# Patient Record
Sex: FEMALE | Race: AMERICAN INDIAN OR ALASKA NATIVE | URBAN - METROPOLITAN AREA
[De-identification: names, ages, dates, MRNs, and addresses within clinical notes are randomized per-mention and may not be internally consistent; named-entity substitution may affect disease eponyms.]

---

## 2023-02-20 ENCOUNTER — LAB OUTSIDE AN ENCOUNTER (OUTPATIENT)
Dept: URBAN - METROPOLITAN AREA CLINIC 115 | Facility: CLINIC | Age: 38
End: 2023-02-20

## 2023-02-20 ENCOUNTER — WEB ENCOUNTER (OUTPATIENT)
Dept: URBAN - METROPOLITAN AREA CLINIC 115 | Facility: CLINIC | Age: 38
End: 2023-02-20

## 2023-02-20 ENCOUNTER — OFFICE VISIT (OUTPATIENT)
Dept: URBAN - METROPOLITAN AREA CLINIC 115 | Facility: CLINIC | Age: 38
End: 2023-02-20
Payer: COMMERCIAL

## 2023-02-20 VITALS
TEMPERATURE: 98 F | HEIGHT: 62 IN | WEIGHT: 152.4 LBS | BODY MASS INDEX: 28.05 KG/M2 | DIASTOLIC BLOOD PRESSURE: 60 MMHG | HEART RATE: 67 BPM | SYSTOLIC BLOOD PRESSURE: 94 MMHG

## 2023-02-20 DIAGNOSIS — R12 HEARTBURN: ICD-10-CM

## 2023-02-20 DIAGNOSIS — R10.13 EPIGASTRIC PAIN: ICD-10-CM

## 2023-02-20 PROCEDURE — 99204 OFFICE O/P NEW MOD 45 MIN: CPT | Performed by: INTERNAL MEDICINE

## 2023-02-20 RX ORDER — OMEPRAZOLE 40 MG/1
1 CAPSULE 30 MINUTES BEFORE MORNING MEAL CAPSULE, DELAYED RELEASE ORAL ONCE A DAY
Status: ACTIVE | COMMUNITY

## 2023-02-20 NOTE — HPI-TODAY'S VISIT:
38 y/o female presents for epigastric pain that sometimes radiates throughout abdomen. Present for 6 months. If doesn't take omeprazole will have vomiting. Pain is dull pain with intermittent heartburn.  Had EGD at [facility], daughter states insurance would not pay for it so they will not give her the results.  No changes in bowel habits.   Never had colonoscopy.

## 2023-03-09 ENCOUNTER — OFFICE VISIT (OUTPATIENT)
Dept: URBAN - METROPOLITAN AREA CLINIC 114 | Facility: CLINIC | Age: 38
End: 2023-03-09

## 2023-03-13 ENCOUNTER — OFFICE VISIT (OUTPATIENT)
Dept: URBAN - METROPOLITAN AREA CLINIC 114 | Facility: CLINIC | Age: 38
End: 2023-03-13
Payer: COMMERCIAL

## 2023-03-13 DIAGNOSIS — N28.1 RENAL CYST: ICD-10-CM

## 2023-03-13 PROCEDURE — 76705 ECHO EXAM OF ABDOMEN: CPT | Performed by: INTERNAL MEDICINE

## 2023-03-13 RX ORDER — OMEPRAZOLE 40 MG/1
1 CAPSULE 30 MINUTES BEFORE MORNING MEAL CAPSULE, DELAYED RELEASE ORAL ONCE A DAY
Status: ACTIVE | COMMUNITY

## 2023-03-17 ENCOUNTER — TELEPHONE ENCOUNTER (OUTPATIENT)
Dept: URBAN - METROPOLITAN AREA CLINIC 115 | Facility: CLINIC | Age: 38
End: 2023-03-17

## 2023-03-21 ENCOUNTER — LAB OUTSIDE AN ENCOUNTER (OUTPATIENT)
Dept: URBAN - METROPOLITAN AREA CLINIC 115 | Facility: CLINIC | Age: 38
End: 2023-03-21

## 2023-03-22 ENCOUNTER — TELEPHONE ENCOUNTER (OUTPATIENT)
Dept: URBAN - METROPOLITAN AREA CLINIC 115 | Facility: CLINIC | Age: 38
End: 2023-03-22

## 2023-04-03 ENCOUNTER — TELEPHONE ENCOUNTER (OUTPATIENT)
Dept: URBAN - METROPOLITAN AREA CLINIC 115 | Facility: CLINIC | Age: 38
End: 2023-04-03

## 2023-04-04 ENCOUNTER — TELEPHONE ENCOUNTER (OUTPATIENT)
Dept: URBAN - METROPOLITAN AREA CLINIC 115 | Facility: CLINIC | Age: 38
End: 2023-04-04

## 2023-04-12 ENCOUNTER — TELEPHONE ENCOUNTER (OUTPATIENT)
Dept: URBAN - METROPOLITAN AREA CLINIC 115 | Facility: CLINIC | Age: 38
End: 2023-04-12

## 2023-05-01 ENCOUNTER — OFFICE VISIT (OUTPATIENT)
Dept: URBAN - METROPOLITAN AREA CLINIC 115 | Facility: CLINIC | Age: 38
End: 2023-05-01

## 2023-05-01 RX ORDER — OMEPRAZOLE 40 MG/1
1 CAPSULE 30 MINUTES BEFORE MORNING MEAL CAPSULE, DELAYED RELEASE ORAL ONCE A DAY
Status: ACTIVE | COMMUNITY

## 2023-05-31 ENCOUNTER — TELEPHONE ENCOUNTER (OUTPATIENT)
Dept: URBAN - METROPOLITAN AREA CLINIC 115 | Facility: CLINIC | Age: 38
End: 2023-05-31

## 2023-08-15 ENCOUNTER — TELEPHONE ENCOUNTER (OUTPATIENT)
Dept: URBAN - METROPOLITAN AREA CLINIC 82 | Facility: CLINIC | Age: 38
End: 2023-08-15

## 2023-08-16 ENCOUNTER — OFFICE VISIT (OUTPATIENT)
Dept: URBAN - METROPOLITAN AREA CLINIC 115 | Facility: CLINIC | Age: 38
End: 2023-08-16
Payer: COMMERCIAL

## 2023-08-16 VITALS
HEIGHT: 62 IN | TEMPERATURE: 97.5 F | SYSTOLIC BLOOD PRESSURE: 104 MMHG | WEIGHT: 148.4 LBS | BODY MASS INDEX: 27.31 KG/M2 | DIASTOLIC BLOOD PRESSURE: 67 MMHG | HEART RATE: 59 BPM

## 2023-08-16 DIAGNOSIS — N28.9 KIDNEY LESION: ICD-10-CM

## 2023-08-16 DIAGNOSIS — R10.13 EPIGASTRIC PAIN: ICD-10-CM

## 2023-08-16 PROBLEM — 79131000119100: Status: ACTIVE | Noted: 2023-08-16

## 2023-08-16 PROCEDURE — 91065 BREATH HYDROGEN/METHANE TEST: CPT | Performed by: STUDENT IN AN ORGANIZED HEALTH CARE EDUCATION/TRAINING PROGRAM

## 2023-08-16 PROCEDURE — 99214 OFFICE O/P EST MOD 30 MIN: CPT | Performed by: STUDENT IN AN ORGANIZED HEALTH CARE EDUCATION/TRAINING PROGRAM

## 2023-08-16 RX ORDER — OMEPRAZOLE 40 MG/1
1 CAPSULE 30 MINUTES BEFORE MORNING MEAL CAPSULE, DELAYED RELEASE ORAL ONCE A DAY
Status: DISCONTINUED | COMMUNITY

## 2023-08-16 RX ORDER — PANTOPRAZOLE SODIUM 40 MG/1
1 TABLET TABLET, DELAYED RELEASE ORAL
Qty: 30 | Refills: 1 | OUTPATIENT
Start: 2023-08-16

## 2023-08-16 NOTE — HPI-TODAY'S VISIT:
38 y.o female presents today for concerns of abd pain x years. Saw Dr. Stubbs earlier this year inregards to this concerns, US on 03/2023 notable for right kidney cyst 5.2, was informed to see , saw them, was informed that it was normal, no CT A/P completed.   At this visit, patient reports pain in periumbilical region, worse w spicy food or patient if patient is hungry. No nocturnal sx or unintentional wt loss. No N/V. Doesnt take anything for this. Stop omeprazole 40mg 4 months ago, no changes noted. Was taking for 1.5 years, no relief noted back then. Moderate relief w/ vidal-x, otc med for acid reflx. Denies any NSAIDs use. No smoking or ETOH. No fhx of gastric cancer or CRC.

## 2023-08-16 NOTE — PHYSICAL EXAM GASTROINTESTINAL
Abdomen soft, TTP in epigastic region, nondistended , no guarding or rigidity , no masses palpable , unremarkable bowel sounds , no hepatosplenomegaly

## 2023-08-16 NOTE — PHYSICAL EXAM CONSTITUTIONAL:
NAD, alert and oriented, well developed, well nourished, ambulating without difficulty, sitting comfortably in the chair. Nephew by side, translating for patient

## 2023-08-23 ENCOUNTER — TELEPHONE ENCOUNTER (OUTPATIENT)
Dept: URBAN - METROPOLITAN AREA CLINIC 82 | Facility: CLINIC | Age: 38
End: 2023-08-23

## 2023-08-23 LAB
A/G RATIO: 1.6
ABSOLUTE BASOPHILS: 31
ABSOLUTE EOSINOPHILS: 130
ABSOLUTE LYMPHOCYTES: 1841
ABSOLUTE MONOCYTES: 312
ABSOLUTE NEUTROPHILS: 2886
ALBUMIN: 4.1
ALKALINE PHOSPHATASE: 68
ALT (SGPT): 8
AST (SGOT): 12
BASOPHILS: 0.6
BILIRUBIN, TOTAL: 0.8
BUN/CREATININE RATIO: (no result)
BUN: 10
CALCIUM: 9.2
CARBON DIOXIDE, TOTAL: 23
CHLORIDE: 106
CREATININE: 0.62
EGFR: 116
EOSINOPHILS: 2.5
GLOBULIN, TOTAL: 2.5
GLUCOSE: 79
H PYLORI BREATH TEST: NOT DETECTED
H. PYLORI BREATH TEST: NOT DETECTED
HEMATOCRIT: 38
HEMOGLOBIN: 12.7
IMMUNOGLOBULIN A: 187
INTERPRETATION: (no result)
INTERPRETATION: NOT DETECTED
LIPASE: 11
LYMPHOCYTES: 35.4
MCH: 28.7
MCHC: 33.4
MCV: 85.8
MONOCYTES: 6
MPV: 10
NEUTROPHILS: 55.5
PLATELET COUNT: 272
POTASSIUM: 4.2
PROTEIN, TOTAL: 6.6
RDW: 12.9
RED BLOOD CELL COUNT: 4.43
SODIUM: 139
TISSUE TRANSGLUTAMINASE AB, IGA: <1
WHITE BLOOD CELL COUNT: 5.2

## 2023-10-11 ENCOUNTER — OFFICE VISIT (OUTPATIENT)
Dept: URBAN - METROPOLITAN AREA CLINIC 115 | Facility: CLINIC | Age: 38
End: 2023-10-11
Payer: COMMERCIAL

## 2023-10-11 VITALS
SYSTOLIC BLOOD PRESSURE: 92 MMHG | WEIGHT: 150 LBS | TEMPERATURE: 97.8 F | HEIGHT: 62 IN | HEART RATE: 63 BPM | DIASTOLIC BLOOD PRESSURE: 58 MMHG | BODY MASS INDEX: 27.6 KG/M2

## 2023-10-11 DIAGNOSIS — K29.70 GASTRITIS DETERMINED BY ENDOSCOPY: ICD-10-CM

## 2023-10-11 DIAGNOSIS — K20.80 LOS ANGELES GRADE A ESOPHAGITIS: ICD-10-CM

## 2023-10-11 DIAGNOSIS — R10.33 PERIUMBILICAL PAIN: ICD-10-CM

## 2023-10-11 PROBLEM — 443503005: Status: ACTIVE | Noted: 2023-10-11

## 2023-10-11 PROBLEM — 16761005: Status: ACTIVE | Noted: 2023-10-11

## 2023-10-11 PROCEDURE — 99214 OFFICE O/P EST MOD 30 MIN: CPT | Performed by: STUDENT IN AN ORGANIZED HEALTH CARE EDUCATION/TRAINING PROGRAM

## 2023-10-11 RX ORDER — PANTOPRAZOLE SODIUM 40 MG/1
1 TABLET TABLET, DELAYED RELEASE ORAL
Qty: 30 | Refills: 1 | Status: DISCONTINUED | COMMUNITY
Start: 2023-08-16

## 2023-10-11 RX ORDER — PANTOPRAZOLE SODIUM 40 MG/1
1 TABLET TABLET, DELAYED RELEASE ORAL
Qty: 90 | Refills: 0
Start: 2023-08-16

## 2023-10-11 NOTE — PHYSICAL EXAM GASTROINTESTINAL
Abdomen soft, non tender, nondistended , no guarding or rigidity , no masses palpable , unremarkable bowel sounds , no hepatosplenomegaly

## 2023-10-11 NOTE — HPI-TODAY'S VISIT:
38 y.o female presents today for f/u. Recap of last visit: abd pain x years. Saw Dr. Stubbs earlier this year inregards to this concerns, US on 03/2023 notable for right kidney cyst 5.2, was informed to see , saw them, was informed that it was normal, no CT A/P completed.   At last visit, patient reports pain in periumbilical region, worse w spicy food or patient if patient is hungry. No nocturnal sx or unintentional wt loss. No N/V. Doesnt take anything for this. Stop omeprazole 40mg 4 months ago, no changes noted. Was taking for 1.5 years, no relief noted back then. Moderate relief w/ vidal-x, otc med for acid reflx. Denies any NSAIDs use. No smoking or ETOH. No fhx of gastric cancer or CRC.  EGD 2022: Esophagitis, grade a, mild gastritis, bx unremarkable.  At this visit, patient reports sx  controlled w/ pantoprazole 40mg daily. No n/v. Following GERD diet. No changes in her weight. BM daily. Bloating also improves. No further questions today.

## 2024-01-04 ENCOUNTER — OFFICE VISIT (OUTPATIENT)
Dept: URBAN - METROPOLITAN AREA CLINIC 82 | Facility: CLINIC | Age: 39
End: 2024-01-04

## 2024-01-04 RX ORDER — PANTOPRAZOLE SODIUM 40 MG/1
1 TABLET TABLET, DELAYED RELEASE ORAL
Qty: 90 | Refills: 0 | Status: ACTIVE | COMMUNITY
Start: 2023-08-16

## 2024-01-09 ENCOUNTER — OFFICE VISIT (OUTPATIENT)
Dept: URBAN - METROPOLITAN AREA CLINIC 115 | Facility: CLINIC | Age: 39
End: 2024-01-09

## 2024-01-11 ENCOUNTER — OFFICE VISIT (OUTPATIENT)
Dept: URBAN - METROPOLITAN AREA CLINIC 82 | Facility: CLINIC | Age: 39
End: 2024-01-11

## 2024-02-08 ENCOUNTER — OV EP (OUTPATIENT)
Dept: URBAN - METROPOLITAN AREA CLINIC 82 | Facility: CLINIC | Age: 39
End: 2024-02-08
Payer: COMMERCIAL

## 2024-02-08 VITALS
BODY MASS INDEX: 28.05 KG/M2 | HEIGHT: 62 IN | DIASTOLIC BLOOD PRESSURE: 66 MMHG | WEIGHT: 152.4 LBS | SYSTOLIC BLOOD PRESSURE: 98 MMHG | TEMPERATURE: 97.2 F | HEART RATE: 66 BPM

## 2024-02-08 DIAGNOSIS — K21.9 CHRONIC GERD: ICD-10-CM

## 2024-02-08 PROBLEM — 235595009: Status: ACTIVE | Noted: 2024-02-08

## 2024-02-08 PROCEDURE — 99213 OFFICE O/P EST LOW 20 MIN: CPT | Performed by: INTERNAL MEDICINE

## 2024-02-08 RX ORDER — FAMOTIDINE 40 MG/1
1 TABLET AT BEDTIME TABLET, FILM COATED ORAL ONCE A DAY
Qty: 30 | Refills: 1 | OUTPATIENT
Start: 2024-02-08

## 2024-02-08 RX ORDER — PANTOPRAZOLE SODIUM 40 MG/1
1 TABLET TABLET, DELAYED RELEASE ORAL
Qty: 90 | Refills: 0 | Status: ACTIVE | COMMUNITY
Start: 2023-08-16

## 2024-02-08 RX ORDER — OMEPRAZOLE 40 MG/1
1 CAPSULE 30 MINUTES BEFORE MORNING MEAL CAPSULE, DELAYED RELEASE ORAL ONCE A DAY
Qty: 30 | Refills: 1 | OUTPATIENT
Start: 2024-02-08

## 2024-02-08 NOTE — HPI-TODAY'S VISIT:
Pt of Dr. Stubbs. reports pantop not working, ran out, or gerd diet, still w intermittent gerd, saliva seems to regurgitate at night and bothersome. had EGD '22 w [facility] w grade A reflux, gastritis, no hp.

## 2024-03-28 ENCOUNTER — OV EP (OUTPATIENT)
Dept: URBAN - METROPOLITAN AREA CLINIC 82 | Facility: CLINIC | Age: 39
End: 2024-03-28
Payer: COMMERCIAL

## 2024-03-28 ENCOUNTER — LAB (OUTPATIENT)
Dept: URBAN - METROPOLITAN AREA CLINIC 82 | Facility: CLINIC | Age: 39
End: 2024-03-28

## 2024-03-28 VITALS
BODY MASS INDEX: 28.08 KG/M2 | DIASTOLIC BLOOD PRESSURE: 71 MMHG | HEART RATE: 63 BPM | SYSTOLIC BLOOD PRESSURE: 112 MMHG | WEIGHT: 152.6 LBS | HEIGHT: 62 IN | TEMPERATURE: 97.5 F

## 2024-03-28 DIAGNOSIS — K21.9 CHRONIC GERD: ICD-10-CM

## 2024-03-28 PROCEDURE — 99214 OFFICE O/P EST MOD 30 MIN: CPT | Performed by: STUDENT IN AN ORGANIZED HEALTH CARE EDUCATION/TRAINING PROGRAM

## 2024-03-28 RX ORDER — FAMOTIDINE 40 MG/1
1 TABLET AT BEDTIME TABLET, FILM COATED ORAL ONCE A DAY
Qty: 30 | Refills: 1 | Status: ACTIVE | COMMUNITY
Start: 2024-02-08

## 2024-03-28 RX ORDER — FAMOTIDINE 40 MG/1
1 TABLET AT BEDTIME TABLET, FILM COATED ORAL ONCE A DAY
Qty: 90 TABLET | Refills: 2 | OUTPATIENT

## 2024-03-28 RX ORDER — OMEPRAZOLE 40 MG/1
1 CAPSULE 30 MINUTES BEFORE MORNING MEAL CAPSULE, DELAYED RELEASE ORAL ONCE A DAY
Qty: 90 | Refills: 3 | OUTPATIENT

## 2024-03-28 RX ORDER — PANTOPRAZOLE SODIUM 40 MG/1
1 TABLET TABLET, DELAYED RELEASE ORAL
Qty: 90 | Refills: 0 | Status: DISCONTINUED | COMMUNITY
Start: 2023-08-16

## 2024-03-28 RX ORDER — OMEPRAZOLE 40 MG/1
1 CAPSULE 30 MINUTES BEFORE MORNING MEAL CAPSULE, DELAYED RELEASE ORAL ONCE A DAY
Qty: 30 | Refills: 1 | Status: DISCONTINUED | COMMUNITY
Start: 2024-02-08

## 2024-03-28 NOTE — HPI-TODAY'S VISIT:
38 y.o female presents today for F.U  Patient was dealing w GERD flares at previous visit. Reports bloating, epigastric pain. No NV, burning sensation. No dysphagia. Was given omeprazole 40mg and famotidine 40mg qhs. Admits great controlled. Denies any NSAIDs use. No smoking or ETOH. No fhx of gastric cancer or CRC. EGD 2022: Esophagitis, grade a, mild gastritis, bx unremarkable.  RUQ US 03/2023 normal.

## 2024-04-18 ENCOUNTER — EGD (OUTPATIENT)
Dept: URBAN - METROPOLITAN AREA SURGERY CENTER 13 | Facility: SURGERY CENTER | Age: 39
End: 2024-04-18

## 2024-04-23 ENCOUNTER — EGD (OUTPATIENT)
Dept: URBAN - METROPOLITAN AREA SURGERY CENTER 13 | Facility: SURGERY CENTER | Age: 39
End: 2024-04-23
Payer: COMMERCIAL

## 2024-04-23 ENCOUNTER — LAB (OUTPATIENT)
Dept: URBAN - METROPOLITAN AREA CLINIC 4 | Facility: CLINIC | Age: 39
End: 2024-04-23
Payer: COMMERCIAL

## 2024-04-23 DIAGNOSIS — K29.60 OTHER GASTRITIS WITHOUT BLEEDING: ICD-10-CM

## 2024-04-23 DIAGNOSIS — K31.A0 GASTRIC INTESTINAL METAPLASIA, UNSPECIFIED: ICD-10-CM

## 2024-04-23 DIAGNOSIS — R12 HEARTBURN: ICD-10-CM

## 2024-04-23 PROCEDURE — 88305 TISSUE EXAM BY PATHOLOGIST: CPT | Performed by: PATHOLOGY

## 2024-04-23 PROCEDURE — 88342 IMHCHEM/IMCYTCHM 1ST ANTB: CPT | Performed by: PATHOLOGY

## 2024-04-23 PROCEDURE — 43239 EGD BIOPSY SINGLE/MULTIPLE: CPT | Performed by: INTERNAL MEDICINE

## 2024-04-23 RX ORDER — OMEPRAZOLE 40 MG/1
1 CAPSULE 30 MINUTES BEFORE MORNING MEAL CAPSULE, DELAYED RELEASE ORAL ONCE A DAY
Qty: 90 | Refills: 3 | Status: ACTIVE | COMMUNITY

## 2024-04-23 RX ORDER — FAMOTIDINE 40 MG/1
1 TABLET AT BEDTIME TABLET, FILM COATED ORAL ONCE A DAY
Qty: 90 TABLET | Refills: 2 | Status: ACTIVE | COMMUNITY

## 2024-04-24 ENCOUNTER — EGD (OUTPATIENT)
Dept: URBAN - METROPOLITAN AREA SURGERY CENTER 13 | Facility: SURGERY CENTER | Age: 39
End: 2024-04-24

## 2024-05-08 ENCOUNTER — DASHBOARD ENCOUNTERS (OUTPATIENT)
Age: 39
End: 2024-05-08

## 2024-05-14 ENCOUNTER — OFFICE VISIT (OUTPATIENT)
Dept: URBAN - METROPOLITAN AREA CLINIC 82 | Facility: CLINIC | Age: 39
End: 2024-05-14
Payer: COMMERCIAL

## 2024-05-14 VITALS
SYSTOLIC BLOOD PRESSURE: 96 MMHG | HEIGHT: 62 IN | HEART RATE: 65 BPM | TEMPERATURE: 98.1 F | WEIGHT: 151.6 LBS | DIASTOLIC BLOOD PRESSURE: 58 MMHG | BODY MASS INDEX: 27.9 KG/M2

## 2024-05-14 DIAGNOSIS — K21.9 CHRONIC GERD: ICD-10-CM

## 2024-05-14 PROCEDURE — 99213 OFFICE O/P EST LOW 20 MIN: CPT | Performed by: STUDENT IN AN ORGANIZED HEALTH CARE EDUCATION/TRAINING PROGRAM

## 2024-05-14 RX ORDER — OMEPRAZOLE 40 MG/1
1 CAPSULE 30 MINUTES BEFORE MORNING MEAL CAPSULE, DELAYED RELEASE ORAL ONCE A DAY
Qty: 90 | Refills: 3 | Status: ACTIVE | COMMUNITY

## 2024-05-14 RX ORDER — FAMOTIDINE 40 MG/1
1 TABLET AT BEDTIME TABLET, FILM COATED ORAL ONCE A DAY
Qty: 90 TABLET | Refills: 2 | Status: ACTIVE | COMMUNITY